# Patient Record
Sex: MALE | Race: WHITE
[De-identification: names, ages, dates, MRNs, and addresses within clinical notes are randomized per-mention and may not be internally consistent; named-entity substitution may affect disease eponyms.]

---

## 2019-04-20 ENCOUNTER — HOSPITAL ENCOUNTER (EMERGENCY)
Dept: HOSPITAL 61 - ER | Age: 24
Discharge: HOME | End: 2019-04-20
Payer: SELF-PAY

## 2019-04-20 VITALS — DIASTOLIC BLOOD PRESSURE: 91 MMHG | SYSTOLIC BLOOD PRESSURE: 147 MMHG

## 2019-04-20 VITALS — HEIGHT: 68 IN | WEIGHT: 158.25 LBS | BODY MASS INDEX: 23.98 KG/M2
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
## 2019-04-20 DIAGNOSIS — Z72.0: ICD-10-CM
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
## 2019-04-20 DIAGNOSIS — S50.311A: ICD-10-CM

## 2019-04-20 DIAGNOSIS — S80.212A: ICD-10-CM

## 2019-04-20 DIAGNOSIS — S93.491A: ICD-10-CM

## 2019-04-20 DIAGNOSIS — S52.134A: Primary | ICD-10-CM
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
## 2019-04-20 DIAGNOSIS — S40.212A: ICD-10-CM

## 2019-04-20 DIAGNOSIS — S80.211A: ICD-10-CM

## 2019-04-20 PROCEDURE — 90715 TDAP VACCINE 7 YRS/> IM: CPT

## 2019-04-20 PROCEDURE — 12002 RPR S/N/AX/GEN/TRNK2.6-7.5CM: CPT

## 2019-04-20 PROCEDURE — 96374 THER/PROPH/DIAG INJ IV PUSH: CPT

## 2019-04-20 PROCEDURE — 90471 IMMUNIZATION ADMIN: CPT

## 2019-04-20 PROCEDURE — 99284 EMERGENCY DEPT VISIT MOD MDM: CPT

## 2019-04-20 PROCEDURE — 73080 X-RAY EXAM OF ELBOW: CPT

## 2019-04-20 PROCEDURE — 29105 APPLICATION LONG ARM SPLINT: CPT

## 2019-04-20 PROCEDURE — 73610 X-RAY EXAM OF ANKLE: CPT

## 2019-04-20 PROCEDURE — 29515 APPLICATION SHORT LEG SPLINT: CPT

## 2019-04-20 NOTE — RAD
3 view right ankle 4/20/2019

 

CLINICAL INDICATION: Right ankle trauma with inability to flex and extend 

the joint.

 

COMPARISON: None.

 

FINDINGS:

 

No acute fracture or traumatic malalignment. Moderate ankle joint 

effusion. No significant thickening of the Achilles tendon. Kager's fat is

maintained.

 

IMPRESSION:

1. No acute osseous abnormality.

2. Moderate ankle joint effusion, may represent internal soft tissue 

derangement.

 

Electronically signed by: Carl Quarles MD (4/20/2019 6:43 PM) [facility]

## 2019-04-20 NOTE — RAD
3 view right elbow 4/20/2019

 

CLINICAL INDICATION: Right elbow pain after trauma.

 

COMPARISON: None.

 

FINDINGS: There is a nondisplaced fracture at the anterior radial neck 

without intra-articular extension. There is a moderate elbow joint 

effusion. Normal elbow alignment. There is a obliquely oriented lucency of

the proximal one 3rd of the ulnar diaphysis only seen on the AP view 

without extension to the cortex or visualization on other views and 

favored to represent a nutrient foramen.

 

IMPRESSION: Nondisplaced radial neck fracture with moderate elbow joint 

effusion.

 

Electronically signed by: Carl Quarles MD (4/20/2019 8:14 PM) [facility]
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
## 2021-01-29 ENCOUNTER — HOSPITAL ENCOUNTER (EMERGENCY)
Dept: HOSPITAL 63 - ER | Age: 26
LOS: 1 days | Discharge: HOME | End: 2021-01-30
Payer: COMMERCIAL

## 2021-01-29 VITALS — BODY MASS INDEX: 24.42 KG/M2 | WEIGHT: 161.16 LBS | HEIGHT: 68 IN

## 2021-01-29 DIAGNOSIS — R07.2: Primary | ICD-10-CM

## 2021-01-29 DIAGNOSIS — R12: ICD-10-CM

## 2021-01-29 DIAGNOSIS — R42: ICD-10-CM

## 2021-01-29 DIAGNOSIS — R11.0: ICD-10-CM

## 2021-01-29 DIAGNOSIS — F17.220: ICD-10-CM

## 2021-01-29 PROCEDURE — 99285 EMERGENCY DEPT VISIT HI MDM: CPT

## 2021-01-29 PROCEDURE — 96372 THER/PROPH/DIAG INJ SC/IM: CPT

## 2021-01-29 PROCEDURE — 71045 X-RAY EXAM CHEST 1 VIEW: CPT

## 2021-01-29 PROCEDURE — 84484 ASSAY OF TROPONIN QUANT: CPT

## 2021-01-29 PROCEDURE — 36415 COLL VENOUS BLD VENIPUNCTURE: CPT

## 2021-01-29 PROCEDURE — 93005 ELECTROCARDIOGRAM TRACING: CPT

## 2021-01-30 NOTE — EKG
[facility] [address], [place] [address]

Test Date:    2021               Test Time:    00:13:07

Pat Name:     SHYLA GAR          Department:   

Patient ID:   [id number]           Room:          

Gender:       M                        Technician:   

:          [id number]               Requested By: SOTERO LOZANO

Order Number: [id number]            Reading MD:     

                                 Measurements

Intervals                              Axis          

Rate:         74                       P:            62

DE:           142                      QRS:          91

QRSD:         100                      T:            36

QT:           372                                    

QTc:          413                                    

                           Interpretive Statements

SINUS ARRHYTHMIA

RIGHTWARD AXIS

OTHERWISE NORMAL ECG

RI6.02

No previous ECG available for comparison

## 2021-01-30 NOTE — RAD
XR CHEST 1V



Clinical Indication: Reason: chest pain 



Comparison:  None.



Findings: 

The cardiomediastinal silhouette is normal. Lungs are clear. There is no pneumothorax. No pleural eff
usion is appreciated. No acute bone abnormality.



IMPRESSION: 

No acute cardiopulmonary process.





Electronically signed by: Miguel Short MD (1/30/2021 12:05 AM) [facility]

## 2021-07-31 ENCOUNTER — HOSPITAL ENCOUNTER (OUTPATIENT)
Dept: HOSPITAL 63 - LAB | Age: 26
End: 2021-07-31
Attending: NURSE PRACTITIONER
Payer: COMMERCIAL

## 2021-07-31 DIAGNOSIS — R22.1: Primary | ICD-10-CM

## 2021-07-31 DIAGNOSIS — Z00.01: ICD-10-CM

## 2021-07-31 LAB
ALBUMIN SERPL-MCNC: 4.7 G/DL (ref 3.4–5)
ALBUMIN/GLOB SERPL: 1.7 {RATIO} (ref 1–1.7)
ALP SERPL-CCNC: 56 U/L (ref 46–116)
ALT SERPL-CCNC: 23 U/L (ref 16–63)
ANION GAP SERPL CALC-SCNC: 8 MMOL/L (ref 6–14)
APTT PPP: YELLOW S
AST SERPL-CCNC: 20 U/L (ref 15–37)
BACTERIA #/AREA URNS HPF: 0 /HPF
BASOPHILS # BLD AUTO: 0 X10^3/UL (ref 0–0.2)
BASOPHILS NFR BLD: 1 % (ref 0–3)
BILIRUB SERPL-MCNC: 1.1 MG/DL (ref 0.2–1)
BILIRUB UR QL STRIP: (no result)
BUN/CREAT SERPL: 13 (ref 6–20)
CA-I SERPL ISE-MCNC: 10 MG/DL (ref 8–26)
CALCIUM SERPL-MCNC: 9 MG/DL (ref 8.5–10.1)
CHLORIDE SERPL-SCNC: 103 MMOL/L (ref 98–107)
CHOLEST/HDLC SERPL: 2 {RATIO}
CO2 SERPL-SCNC: 29 MMOL/L (ref 21–32)
CREAT SERPL-MCNC: 0.8 MG/DL (ref 0.7–1.3)
CRP SERPL-MCNC: 1 MG/L (ref 0–3.3)
EOSINOPHIL NFR BLD: 0 % (ref 0–3)
EOSINOPHIL NFR BLD: 0 X10^3/UL (ref 0–0.7)
ERYTHROCYTE [DISTWIDTH] IN BLOOD BY AUTOMATED COUNT: 13.4 % (ref 11.5–14.5)
ERYTHROCYTE [SEDIMENTATION RATE] IN BLOOD: 4 MM/H (ref 0–15)
FIBRINOGEN PPP-MCNC: CLEAR MG/DL
GFR SERPLBLD BASED ON 1.73 SQ M-ARVRAT: 117.8 ML/MIN
GLOBULIN SER-MCNC: 2.8 G/DL (ref 2.2–3.8)
GLUCOSE SERPL-MCNC: 95 MG/DL (ref 70–99)
GLUCOSE UR STRIP-MCNC: (no result) MG/DL
HCT VFR BLD CALC: 43.4 % (ref 39–53)
HDLC SERPL-MCNC: 55 MG/DL (ref 40–60)
HGB BLD-MCNC: 14.9 G/DL (ref 13–17.5)
LDLC: 64 MG/DL (ref 0–100)
LYMPHOCYTES # BLD: 0.7 X10^3/UL (ref 1–4.8)
LYMPHOCYTES NFR BLD AUTO: 10 % (ref 24–48)
MCH RBC QN AUTO: 31 PG (ref 25–35)
MCHC RBC AUTO-ENTMCNC: 35 G/DL (ref 31–37)
MCV RBC AUTO: 91 FL (ref 79–100)
MONO #: 0.4 X10^3/UL (ref 0–1.1)
MONOCYTES NFR BLD: 6 % (ref 0–9)
NEUT #: 5.8 X10^3UL (ref 1.8–7.7)
NEUTROPHILS NFR BLD AUTO: 83 % (ref 31–73)
NITRITE UR QL STRIP: (no result)
PLATELET # BLD AUTO: 205 X10^3/UL (ref 140–400)
POTASSIUM SERPL-SCNC: 4 MMOL/L (ref 3.5–5.1)
PROT SERPL-MCNC: 7.5 G/DL (ref 6.4–8.2)
RBC # BLD AUTO: 4.76 X10^6/UL (ref 4.3–5.7)
RBC #/AREA URNS HPF: 0 /HPF (ref 0–2)
SODIUM SERPL-SCNC: 140 MMOL/L (ref 136–145)
SP GR UR STRIP: 1.01
THYROID STIM HORMONE (TSH): 1.49 UIU/ML (ref 0.36–3.74)
TRIGL SERPL-MCNC: 35 MG/DL (ref 0–150)
UROBILINOGEN UR-MCNC: 0.2 MG/DL
VLDLC: 7 MG/DL (ref 0–40)
WBC # BLD AUTO: 7 X10^3/UL (ref 4–11)
WBC #/AREA URNS HPF: 0 /HPF (ref 0–4)

## 2021-07-31 PROCEDURE — 80061 LIPID PANEL: CPT

## 2021-07-31 PROCEDURE — 81001 URINALYSIS AUTO W/SCOPE: CPT

## 2021-07-31 PROCEDURE — 85025 COMPLETE CBC W/AUTO DIFF WBC: CPT

## 2021-07-31 PROCEDURE — 85651 RBC SED RATE NONAUTOMATED: CPT

## 2021-07-31 PROCEDURE — 86140 C-REACTIVE PROTEIN: CPT

## 2021-07-31 PROCEDURE — 36415 COLL VENOUS BLD VENIPUNCTURE: CPT

## 2021-07-31 PROCEDURE — 84443 ASSAY THYROID STIM HORMONE: CPT

## 2021-07-31 PROCEDURE — 76536 US EXAM OF HEAD AND NECK: CPT

## 2021-07-31 PROCEDURE — 80053 COMPREHEN METABOLIC PANEL: CPT
